# Patient Record
Sex: FEMALE | Employment: FULL TIME | URBAN - METROPOLITAN AREA
[De-identification: names, ages, dates, MRNs, and addresses within clinical notes are randomized per-mention and may not be internally consistent; named-entity substitution may affect disease eponyms.]

---

## 2019-12-04 ENCOUNTER — OFFICE VISIT (OUTPATIENT)
Dept: FAMILY MEDICINE | Facility: CLINIC | Age: 19
End: 2019-12-04

## 2019-12-04 VITALS
RESPIRATION RATE: 18 BRPM | SYSTOLIC BLOOD PRESSURE: 129 MMHG | BODY MASS INDEX: 21.9 KG/M2 | WEIGHT: 116 LBS | DIASTOLIC BLOOD PRESSURE: 88 MMHG | HEIGHT: 61 IN | TEMPERATURE: 98.4 F | OXYGEN SATURATION: 99 % | HEART RATE: 69 BPM

## 2019-12-04 DIAGNOSIS — M54.2 NECK PAIN: Primary | ICD-10-CM

## 2019-12-04 DIAGNOSIS — N91.2 AMENORRHEA: ICD-10-CM

## 2019-12-04 LAB
HCG SERPL QL: NEGATIVE
HCG UR QL: NEGATIVE

## 2019-12-04 PROCEDURE — 84703 CHORIONIC GONADOTROPIN ASSAY: CPT | Performed by: PHYSICIAN ASSISTANT

## 2019-12-04 PROCEDURE — 81025 URINE PREGNANCY TEST: CPT | Performed by: PHYSICIAN ASSISTANT

## 2019-12-04 PROCEDURE — 84702 CHORIONIC GONADOTROPIN TEST: CPT | Performed by: PHYSICIAN ASSISTANT

## 2019-12-04 PROCEDURE — 36415 COLL VENOUS BLD VENIPUNCTURE: CPT | Performed by: PHYSICIAN ASSISTANT

## 2019-12-04 PROCEDURE — 99207 ZZC NO CHARGE LOS: CPT | Performed by: PHYSICIAN ASSISTANT

## 2019-12-04 SDOH — HEALTH STABILITY: MENTAL HEALTH: HOW OFTEN DO YOU HAVE A DRINK CONTAINING ALCOHOL?: NEVER

## 2019-12-04 ASSESSMENT — MIFFLIN-ST. JEOR: SCORE: 1229.58

## 2019-12-04 ASSESSMENT — PAIN SCALES - GENERAL: PAINLEVEL: MODERATE PAIN (4)
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
## 2019-12-05 ENCOUNTER — ANCILLARY PROCEDURE (OUTPATIENT)
Dept: GENERAL RADIOLOGY | Facility: CLINIC | Age: 19
End: 2019-12-05
Attending: PHYSICIAN ASSISTANT
Payer: COMMERCIAL

## 2019-12-05 ENCOUNTER — OFFICE VISIT (OUTPATIENT)
Dept: FAMILY MEDICINE | Facility: CLINIC | Age: 19
End: 2019-12-05

## 2019-12-05 VITALS
DIASTOLIC BLOOD PRESSURE: 78 MMHG | WEIGHT: 117 LBS | HEIGHT: 61 IN | HEART RATE: 83 BPM | TEMPERATURE: 98.5 F | SYSTOLIC BLOOD PRESSURE: 124 MMHG | BODY MASS INDEX: 22.09 KG/M2 | RESPIRATION RATE: 16 BRPM | OXYGEN SATURATION: 100 %
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
## 2019-12-05 DIAGNOSIS — M54.6 ACUTE MIDLINE THORACIC BACK PAIN: ICD-10-CM

## 2019-12-05 DIAGNOSIS — M54.2 NECK PAIN: ICD-10-CM
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
## 2019-12-05 LAB — B-HCG SERPL-ACNC: <1 IU/L (ref 0–5)

## 2019-12-05 PROCEDURE — 99203 OFFICE O/P NEW LOW 30 MIN: CPT | Performed by: PHYSICIAN ASSISTANT

## 2019-12-05 PROCEDURE — 72072 X-RAY EXAM THORAC SPINE 3VWS: CPT | Mod: FY

## 2019-12-05 PROCEDURE — 72040 X-RAY EXAM NECK SPINE 2-3 VW: CPT | Mod: FY

## 2019-12-05 RX ORDER — CYCLOBENZAPRINE HCL 10 MG
10 TABLET ORAL 3 TIMES DAILY PRN
Qty: 90 TABLET | Refills: 0 | Status: ON HOLD | OUTPATIENT
Start: 2019-12-05 | End: 2022-05-17

## 2019-12-05 RX ORDER — IBUPROFEN 600 MG/1
600 TABLET, FILM COATED ORAL EVERY 6 HOURS PRN
Qty: 60 TABLET | Refills: 0 | Status: ON HOLD | OUTPATIENT
Start: 2019-12-05 | End: 2022-05-17

## 2019-12-05 ASSESSMENT — MIFFLIN-ST. JEOR: SCORE: 1234.12

## 2019-12-05 ASSESSMENT — PAIN SCALES - GENERAL: PAINLEVEL: MODERATE PAIN (5)

## 2019-12-05 NOTE — PATIENT INSTRUCTIONS
Wait to work out until pain free  Take ibuprofen 600 mg four times a day with food as needed for pain  Take flexeril 10 mg three times a day as needed for pain.  Return urgently if any change in symptoms like increasing pain, numbness, weakness or other change in symptoms   Follow up with us if headache not improving over the next week
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
## 2020-03-11 ENCOUNTER — HEALTH MAINTENANCE LETTER (OUTPATIENT)
Age: 20
End: 2020-03-11

## 2021-01-04 ENCOUNTER — HEALTH MAINTENANCE LETTER (OUTPATIENT)
Age: 21
End: 2021-01-04

## 2021-04-25 ENCOUNTER — HEALTH MAINTENANCE LETTER (OUTPATIENT)
Age: 21
End: 2021-04-25

## 2021-10-10 ENCOUNTER — HEALTH MAINTENANCE LETTER (OUTPATIENT)
Age: 21
End: 2021-10-10

## 2021-11-10 LAB
ABO (EXTERNAL): NORMAL
HEPATITIS B SURFACE ANTIGEN (EXTERNAL): NONREACTIVE
RH (EXTERNAL): POSITIVE
RUBELLA ANTIBODY IGG (EXTERNAL): NORMAL
TREPONEMA PALLIDUM ANTIBODY (EXTERNAL): NONREACTIVE

## 2022-04-27 ENCOUNTER — TRANSFERRED RECORDS (OUTPATIENT)
Dept: HEALTH INFORMATION MANAGEMENT | Facility: CLINIC | Age: 22
End: 2022-04-27
Payer: COMMERCIAL

## 2022-04-27 LAB — GROUP B STREPTOCOCCUS (EXTERNAL): NEGATIVE

## 2022-05-17 ENCOUNTER — HOSPITAL ENCOUNTER (INPATIENT)
Facility: CLINIC | Age: 22
LOS: 2 days | Discharge: HOME | End: 2022-05-19
Attending: OBSTETRICS & GYNECOLOGY | Admitting: OBSTETRICS & GYNECOLOGY
Payer: COMMERCIAL

## 2022-05-17 PROBLEM — Z36.89 ENCOUNTER FOR TRIAGE IN PREGNANT PATIENT: Status: ACTIVE | Noted: 2022-05-17

## 2022-05-17 LAB
ABO/RH(D): NORMAL
ANTIBODY SCREEN: NEGATIVE
HOLD SPECIMEN: NORMAL
HOLD SPECIMEN: NORMAL
RUPTURE OF FETAL MEMBRANES BY ROM PLUS: POSITIVE
SARS-COV-2 RNA RESP QL NAA+PROBE: NEGATIVE
SPECIMEN EXPIRATION DATE: NORMAL

## 2022-05-17 PROCEDURE — 59025 FETAL NON-STRESS TEST: CPT

## 2022-05-17 PROCEDURE — 84112 EVAL AMNIOTIC FLUID PROTEIN: CPT | Performed by: OBSTETRICS & GYNECOLOGY

## 2022-05-17 PROCEDURE — C9803 HOPD COVID-19 SPEC COLLECT: HCPCS

## 2022-05-17 PROCEDURE — 86780 TREPONEMA PALLIDUM: CPT | Performed by: OBSTETRICS & GYNECOLOGY

## 2022-05-17 PROCEDURE — 250N000009 HC RX 250: Performed by: OBSTETRICS & GYNECOLOGY

## 2022-05-17 PROCEDURE — 120N000001 HC R&B MED SURG/OB

## 2022-05-17 PROCEDURE — 258N000003 HC RX IP 258 OP 636: Performed by: OBSTETRICS & GYNECOLOGY

## 2022-05-17 PROCEDURE — 86901 BLOOD TYPING SEROLOGIC RH(D): CPT | Performed by: OBSTETRICS & GYNECOLOGY

## 2022-05-17 PROCEDURE — 36415 COLL VENOUS BLD VENIPUNCTURE: CPT | Performed by: OBSTETRICS & GYNECOLOGY

## 2022-05-17 PROCEDURE — U0005 INFEC AGEN DETEC AMPLI PROBE: HCPCS | Performed by: OBSTETRICS & GYNECOLOGY

## 2022-05-17 PROCEDURE — G0463 HOSPITAL OUTPT CLINIC VISIT: HCPCS | Mod: 25

## 2022-05-17 RX ORDER — PROCHLORPERAZINE MALEATE 5 MG
10 TABLET ORAL EVERY 6 HOURS PRN
Status: DISCONTINUED | OUTPATIENT
Start: 2022-05-17 | End: 2022-05-17 | Stop reason: HOSPADM

## 2022-05-17 RX ORDER — MISOPROSTOL 200 UG/1
400 TABLET ORAL
Status: DISCONTINUED | OUTPATIENT
Start: 2022-05-17 | End: 2022-05-18 | Stop reason: HOSPADM

## 2022-05-17 RX ORDER — OXYTOCIN/0.9 % SODIUM CHLORIDE 30/500 ML
340 PLASTIC BAG, INJECTION (ML) INTRAVENOUS CONTINUOUS PRN
Status: DISCONTINUED | OUTPATIENT
Start: 2022-05-17 | End: 2022-05-18 | Stop reason: HOSPADM

## 2022-05-17 RX ORDER — OXYTOCIN 10 [USP'U]/ML
10 INJECTION, SOLUTION INTRAMUSCULAR; INTRAVENOUS
Status: DISCONTINUED | OUTPATIENT
Start: 2022-05-17 | End: 2022-05-18 | Stop reason: HOSPADM

## 2022-05-17 RX ORDER — SODIUM CHLORIDE, SODIUM LACTATE, POTASSIUM CHLORIDE, CALCIUM CHLORIDE 600; 310; 30; 20 MG/100ML; MG/100ML; MG/100ML; MG/100ML
INJECTION, SOLUTION INTRAVENOUS CONTINUOUS PRN
Status: DISCONTINUED | OUTPATIENT
Start: 2022-05-17 | End: 2022-05-18 | Stop reason: HOSPADM

## 2022-05-17 RX ORDER — PROCHLORPERAZINE MALEATE 5 MG
10 TABLET ORAL EVERY 6 HOURS PRN
Status: DISCONTINUED | OUTPATIENT
Start: 2022-05-17 | End: 2022-05-18 | Stop reason: HOSPADM

## 2022-05-17 RX ORDER — LIDOCAINE 40 MG/G
CREAM TOPICAL
Status: DISCONTINUED | OUTPATIENT
Start: 2022-05-17 | End: 2022-05-18 | Stop reason: HOSPADM

## 2022-05-17 RX ORDER — METOCLOPRAMIDE 10 MG/1
10 TABLET ORAL EVERY 6 HOURS PRN
Status: DISCONTINUED | OUTPATIENT
Start: 2022-05-17 | End: 2022-05-18 | Stop reason: HOSPADM

## 2022-05-17 RX ORDER — CARBOPROST TROMETHAMINE 250 UG/ML
250 INJECTION, SOLUTION INTRAMUSCULAR
Status: DISCONTINUED | OUTPATIENT
Start: 2022-05-17 | End: 2022-05-18 | Stop reason: HOSPADM

## 2022-05-17 RX ORDER — ONDANSETRON 2 MG/ML
4 INJECTION INTRAMUSCULAR; INTRAVENOUS EVERY 6 HOURS PRN
Status: DISCONTINUED | OUTPATIENT
Start: 2022-05-17 | End: 2022-05-17 | Stop reason: HOSPADM

## 2022-05-17 RX ORDER — OXYTOCIN/0.9 % SODIUM CHLORIDE 30/500 ML
1-24 PLASTIC BAG, INJECTION (ML) INTRAVENOUS CONTINUOUS
Status: DISCONTINUED | OUTPATIENT
Start: 2022-05-17 | End: 2022-05-18 | Stop reason: HOSPADM

## 2022-05-17 RX ORDER — ACETAMINOPHEN 325 MG/1
650 TABLET ORAL EVERY 4 HOURS PRN
Status: DISCONTINUED | OUTPATIENT
Start: 2022-05-17 | End: 2022-05-18 | Stop reason: HOSPADM

## 2022-05-17 RX ORDER — FENTANYL CITRATE 50 UG/ML
50 INJECTION, SOLUTION INTRAMUSCULAR; INTRAVENOUS EVERY 30 MIN PRN
Status: DISCONTINUED | OUTPATIENT
Start: 2022-05-17 | End: 2022-05-18 | Stop reason: HOSPADM

## 2022-05-17 RX ORDER — METOCLOPRAMIDE 10 MG/1
10 TABLET ORAL EVERY 6 HOURS PRN
Status: DISCONTINUED | OUTPATIENT
Start: 2022-05-17 | End: 2022-05-17 | Stop reason: HOSPADM

## 2022-05-17 RX ORDER — ONDANSETRON 2 MG/ML
4 INJECTION INTRAMUSCULAR; INTRAVENOUS EVERY 6 HOURS PRN
Status: DISCONTINUED | OUTPATIENT
Start: 2022-05-17 | End: 2022-05-18 | Stop reason: HOSPADM

## 2022-05-17 RX ORDER — NALOXONE HYDROCHLORIDE 0.4 MG/ML
0.4 INJECTION, SOLUTION INTRAMUSCULAR; INTRAVENOUS; SUBCUTANEOUS
Status: DISCONTINUED | OUTPATIENT
Start: 2022-05-17 | End: 2022-05-18 | Stop reason: HOSPADM

## 2022-05-17 RX ORDER — TRANEXAMIC ACID 10 MG/ML
1 INJECTION, SOLUTION INTRAVENOUS EVERY 30 MIN PRN
Status: DISCONTINUED | OUTPATIENT
Start: 2022-05-17 | End: 2022-05-18 | Stop reason: HOSPADM

## 2022-05-17 RX ORDER — MISOPROSTOL 200 UG/1
800 TABLET ORAL
Status: DISCONTINUED | OUTPATIENT
Start: 2022-05-17 | End: 2022-05-18 | Stop reason: HOSPADM

## 2022-05-17 RX ORDER — OXYTOCIN 10 [USP'U]/ML
10 INJECTION, SOLUTION INTRAMUSCULAR; INTRAVENOUS
Status: DISCONTINUED | OUTPATIENT
Start: 2022-05-17 | End: 2022-05-19

## 2022-05-17 RX ORDER — IBUPROFEN 400 MG/1
800 TABLET, FILM COATED ORAL
Status: DISCONTINUED | OUTPATIENT
Start: 2022-05-17 | End: 2022-05-18

## 2022-05-17 RX ORDER — OXYTOCIN/0.9 % SODIUM CHLORIDE 30/500 ML
100-340 PLASTIC BAG, INJECTION (ML) INTRAVENOUS CONTINUOUS PRN
Status: DISCONTINUED | OUTPATIENT
Start: 2022-05-17 | End: 2022-05-19

## 2022-05-17 RX ORDER — NALOXONE HYDROCHLORIDE 0.4 MG/ML
0.2 INJECTION, SOLUTION INTRAMUSCULAR; INTRAVENOUS; SUBCUTANEOUS
Status: DISCONTINUED | OUTPATIENT
Start: 2022-05-17 | End: 2022-05-18 | Stop reason: HOSPADM

## 2022-05-17 RX ORDER — METHYLERGONOVINE MALEATE 0.2 MG/ML
200 INJECTION INTRAVENOUS
Status: DISCONTINUED | OUTPATIENT
Start: 2022-05-17 | End: 2022-05-18 | Stop reason: HOSPADM

## 2022-05-17 RX ORDER — CITRIC ACID/SODIUM CITRATE 334-500MG
30 SOLUTION, ORAL ORAL
Status: DISCONTINUED | OUTPATIENT
Start: 2022-05-17 | End: 2022-05-18 | Stop reason: HOSPADM

## 2022-05-17 RX ORDER — METOCLOPRAMIDE HYDROCHLORIDE 5 MG/ML
10 INJECTION INTRAMUSCULAR; INTRAVENOUS EVERY 6 HOURS PRN
Status: DISCONTINUED | OUTPATIENT
Start: 2022-05-17 | End: 2022-05-18 | Stop reason: HOSPADM

## 2022-05-17 RX ORDER — PROCHLORPERAZINE 25 MG
25 SUPPOSITORY, RECTAL RECTAL EVERY 12 HOURS PRN
Status: DISCONTINUED | OUTPATIENT
Start: 2022-05-17 | End: 2022-05-17 | Stop reason: HOSPADM

## 2022-05-17 RX ORDER — SODIUM CHLORIDE, SODIUM LACTATE, POTASSIUM CHLORIDE, CALCIUM CHLORIDE 600; 310; 30; 20 MG/100ML; MG/100ML; MG/100ML; MG/100ML
INJECTION, SOLUTION INTRAVENOUS CONTINUOUS
Status: DISCONTINUED | OUTPATIENT
Start: 2022-05-17 | End: 2022-05-18 | Stop reason: HOSPADM

## 2022-05-17 RX ORDER — KETOROLAC TROMETHAMINE 30 MG/ML
30 INJECTION, SOLUTION INTRAMUSCULAR; INTRAVENOUS
Status: DISCONTINUED | OUTPATIENT
Start: 2022-05-17 | End: 2022-05-19

## 2022-05-17 RX ORDER — PROCHLORPERAZINE 25 MG
25 SUPPOSITORY, RECTAL RECTAL EVERY 12 HOURS PRN
Status: DISCONTINUED | OUTPATIENT
Start: 2022-05-17 | End: 2022-05-18 | Stop reason: HOSPADM

## 2022-05-17 RX ORDER — LEVOTHYROXINE SODIUM 25 UG/1
25 TABLET ORAL DAILY
COMMUNITY
Start: 2022-04-10

## 2022-05-17 RX ORDER — ONDANSETRON 4 MG/1
4 TABLET, ORALLY DISINTEGRATING ORAL EVERY 6 HOURS PRN
Status: DISCONTINUED | OUTPATIENT
Start: 2022-05-17 | End: 2022-05-18 | Stop reason: HOSPADM

## 2022-05-17 RX ORDER — ONDANSETRON 4 MG/1
4 TABLET, ORALLY DISINTEGRATING ORAL EVERY 6 HOURS PRN
Status: DISCONTINUED | OUTPATIENT
Start: 2022-05-17 | End: 2022-05-17 | Stop reason: HOSPADM

## 2022-05-17 RX ORDER — METOCLOPRAMIDE HYDROCHLORIDE 5 MG/ML
10 INJECTION INTRAMUSCULAR; INTRAVENOUS EVERY 6 HOURS PRN
Status: DISCONTINUED | OUTPATIENT
Start: 2022-05-17 | End: 2022-05-17 | Stop reason: HOSPADM

## 2022-05-17 RX ADMIN — Medication 2 MILLI-UNITS/MIN: at 22:36

## 2022-05-17 RX ADMIN — SODIUM CHLORIDE, POTASSIUM CHLORIDE, SODIUM LACTATE AND CALCIUM CHLORIDE: 600; 310; 30; 20 INJECTION, SOLUTION INTRAVENOUS at 22:35

## 2022-05-17 RX ADMIN — SODIUM CHLORIDE, POTASSIUM CHLORIDE, SODIUM LACTATE AND CALCIUM CHLORIDE 1000 ML: 600; 310; 30; 20 INJECTION, SOLUTION INTRAVENOUS at 23:42

## 2022-05-18 ENCOUNTER — ANESTHESIA (OUTPATIENT)
Dept: OBGYN | Facility: CLINIC | Age: 22
End: 2022-05-18
Payer: COMMERCIAL

## 2022-05-18 ENCOUNTER — ANESTHESIA EVENT (OUTPATIENT)
Dept: OBGYN | Facility: CLINIC | Age: 22
End: 2022-05-18
Payer: COMMERCIAL

## 2022-05-18 LAB — T PALLIDUM AB SER QL: NONREACTIVE

## 2022-05-18 PROCEDURE — 258N000003 HC RX IP 258 OP 636: Performed by: OBSTETRICS & GYNECOLOGY

## 2022-05-18 PROCEDURE — 3E0R3BZ INTRODUCTION OF ANESTHETIC AGENT INTO SPINAL CANAL, PERCUTANEOUS APPROACH: ICD-10-PCS | Performed by: ANESTHESIOLOGY

## 2022-05-18 PROCEDURE — 250N000011 HC RX IP 250 OP 636: Performed by: ANESTHESIOLOGY

## 2022-05-18 PROCEDURE — 0KQM0ZZ REPAIR PERINEUM MUSCLE, OPEN APPROACH: ICD-10-PCS | Performed by: OBSTETRICS & GYNECOLOGY

## 2022-05-18 PROCEDURE — 00HU33Z INSERTION OF INFUSION DEVICE INTO SPINAL CANAL, PERCUTANEOUS APPROACH: ICD-10-PCS | Performed by: ANESTHESIOLOGY

## 2022-05-18 PROCEDURE — 250N000013 HC RX MED GY IP 250 OP 250 PS 637: Performed by: OBSTETRICS & GYNECOLOGY

## 2022-05-18 PROCEDURE — 120N000012 HC R&B POSTPARTUM

## 2022-05-18 PROCEDURE — 250N000009 HC RX 250: Performed by: OBSTETRICS & GYNECOLOGY

## 2022-05-18 PROCEDURE — 722N000001 HC LABOR CARE VAGINAL DELIVERY SINGLE

## 2022-05-18 PROCEDURE — 250N000011 HC RX IP 250 OP 636: Performed by: OBSTETRICS & GYNECOLOGY

## 2022-05-18 PROCEDURE — 370N000003 HC ANESTHESIA WARD SERVICE

## 2022-05-18 RX ORDER — ROPIVACAINE HYDROCHLORIDE 2 MG/ML
INJECTION, SOLUTION EPIDURAL; INFILTRATION; PERINEURAL
Status: COMPLETED | OUTPATIENT
Start: 2022-05-18 | End: 2022-05-18

## 2022-05-18 RX ORDER — BISACODYL 10 MG
10 SUPPOSITORY, RECTAL RECTAL DAILY PRN
Status: DISCONTINUED | OUTPATIENT
Start: 2022-05-18 | End: 2022-05-19 | Stop reason: HOSPADM

## 2022-05-18 RX ORDER — NALOXONE HYDROCHLORIDE 0.4 MG/ML
0.4 INJECTION, SOLUTION INTRAMUSCULAR; INTRAVENOUS; SUBCUTANEOUS
Status: DISCONTINUED | OUTPATIENT
Start: 2022-05-18 | End: 2022-05-19 | Stop reason: HOSPADM

## 2022-05-18 RX ORDER — OXYTOCIN/0.9 % SODIUM CHLORIDE 30/500 ML
340 PLASTIC BAG, INJECTION (ML) INTRAVENOUS CONTINUOUS PRN
Status: DISCONTINUED | OUTPATIENT
Start: 2022-05-18 | End: 2022-05-19 | Stop reason: HOSPADM

## 2022-05-18 RX ORDER — METHYLERGONOVINE MALEATE 0.2 MG/ML
200 INJECTION INTRAVENOUS
Status: DISCONTINUED | OUTPATIENT
Start: 2022-05-18 | End: 2022-05-19 | Stop reason: HOSPADM

## 2022-05-18 RX ORDER — EPHEDRINE SULFATE 50 MG/ML
5 INJECTION, SOLUTION INTRAMUSCULAR; INTRAVENOUS; SUBCUTANEOUS
Status: DISCONTINUED | OUTPATIENT
Start: 2022-05-18 | End: 2022-05-18 | Stop reason: HOSPADM

## 2022-05-18 RX ORDER — CARBOPROST TROMETHAMINE 250 UG/ML
250 INJECTION, SOLUTION INTRAMUSCULAR
Status: DISCONTINUED | OUTPATIENT
Start: 2022-05-18 | End: 2022-05-19 | Stop reason: HOSPADM

## 2022-05-18 RX ORDER — MISOPROSTOL 200 UG/1
400 TABLET ORAL
Status: DISCONTINUED | OUTPATIENT
Start: 2022-05-18 | End: 2022-05-19 | Stop reason: HOSPADM

## 2022-05-18 RX ORDER — IBUPROFEN 400 MG/1
800 TABLET, FILM COATED ORAL EVERY 6 HOURS PRN
Status: DISCONTINUED | OUTPATIENT
Start: 2022-05-18 | End: 2022-05-19 | Stop reason: HOSPADM

## 2022-05-18 RX ORDER — OXYTOCIN 10 [USP'U]/ML
10 INJECTION, SOLUTION INTRAMUSCULAR; INTRAVENOUS
Status: DISCONTINUED | OUTPATIENT
Start: 2022-05-18 | End: 2022-05-19 | Stop reason: HOSPADM

## 2022-05-18 RX ORDER — ROPIVACAINE HYDROCHLORIDE 2 MG/ML
10 INJECTION, SOLUTION EPIDURAL; INFILTRATION; PERINEURAL ONCE
Status: DISCONTINUED | OUTPATIENT
Start: 2022-05-18 | End: 2022-05-18 | Stop reason: HOSPADM

## 2022-05-18 RX ORDER — DIPHENHYDRAMINE HYDROCHLORIDE 50 MG/ML
25 INJECTION INTRAMUSCULAR; INTRAVENOUS EVERY 6 HOURS PRN
Status: DISCONTINUED | OUTPATIENT
Start: 2022-05-18 | End: 2022-05-19 | Stop reason: HOSPADM

## 2022-05-18 RX ORDER — MODIFIED LANOLIN
OINTMENT (GRAM) TOPICAL
Status: DISCONTINUED | OUTPATIENT
Start: 2022-05-18 | End: 2022-05-19 | Stop reason: HOSPADM

## 2022-05-18 RX ORDER — ONDANSETRON 2 MG/ML
4 INJECTION INTRAMUSCULAR; INTRAVENOUS EVERY 6 HOURS PRN
Status: DISCONTINUED | OUTPATIENT
Start: 2022-05-18 | End: 2022-05-18 | Stop reason: HOSPADM

## 2022-05-18 RX ORDER — OXYCODONE HYDROCHLORIDE 5 MG/1
5 TABLET ORAL EVERY 4 HOURS PRN
Status: DISCONTINUED | OUTPATIENT
Start: 2022-05-18 | End: 2022-05-19 | Stop reason: HOSPADM

## 2022-05-18 RX ORDER — NALOXONE HYDROCHLORIDE 0.4 MG/ML
0.2 INJECTION, SOLUTION INTRAMUSCULAR; INTRAVENOUS; SUBCUTANEOUS
Status: DISCONTINUED | OUTPATIENT
Start: 2022-05-18 | End: 2022-05-19 | Stop reason: HOSPADM

## 2022-05-18 RX ORDER — MISOPROSTOL 200 UG/1
800 TABLET ORAL
Status: DISCONTINUED | OUTPATIENT
Start: 2022-05-18 | End: 2022-05-19 | Stop reason: HOSPADM

## 2022-05-18 RX ORDER — NALBUPHINE HYDROCHLORIDE 20 MG/ML
2.5-5 INJECTION, SOLUTION INTRAMUSCULAR; INTRAVENOUS; SUBCUTANEOUS EVERY 6 HOURS PRN
Status: DISCONTINUED | OUTPATIENT
Start: 2022-05-18 | End: 2022-05-19 | Stop reason: HOSPADM

## 2022-05-18 RX ORDER — ONDANSETRON 4 MG/1
4 TABLET, ORALLY DISINTEGRATING ORAL EVERY 6 HOURS PRN
Status: DISCONTINUED | OUTPATIENT
Start: 2022-05-18 | End: 2022-05-18 | Stop reason: HOSPADM

## 2022-05-18 RX ORDER — DIPHENHYDRAMINE HCL 25 MG
25 CAPSULE ORAL EVERY 6 HOURS PRN
Status: DISCONTINUED | OUTPATIENT
Start: 2022-05-18 | End: 2022-05-19 | Stop reason: HOSPADM

## 2022-05-18 RX ORDER — TRANEXAMIC ACID 10 MG/ML
1 INJECTION, SOLUTION INTRAVENOUS EVERY 30 MIN PRN
Status: DISCONTINUED | OUTPATIENT
Start: 2022-05-18 | End: 2022-05-19 | Stop reason: HOSPADM

## 2022-05-18 RX ORDER — DOCUSATE SODIUM 100 MG/1
100 CAPSULE, LIQUID FILLED ORAL DAILY
Status: DISCONTINUED | OUTPATIENT
Start: 2022-05-18 | End: 2022-05-19 | Stop reason: HOSPADM

## 2022-05-18 RX ORDER — HYDROCORTISONE 25 MG/G
CREAM TOPICAL 3 TIMES DAILY PRN
Status: DISCONTINUED | OUTPATIENT
Start: 2022-05-18 | End: 2022-05-19 | Stop reason: HOSPADM

## 2022-05-18 RX ORDER — ACETAMINOPHEN 325 MG/1
650 TABLET ORAL EVERY 4 HOURS PRN
Status: DISCONTINUED | OUTPATIENT
Start: 2022-05-18 | End: 2022-05-19 | Stop reason: HOSPADM

## 2022-05-18 RX ADMIN — DIPHENHYDRAMINE HYDROCHLORIDE 25 MG: 50 INJECTION, SOLUTION INTRAMUSCULAR; INTRAVENOUS at 05:37

## 2022-05-18 RX ADMIN — Medication: at 00:26

## 2022-05-18 RX ADMIN — ACETAMINOPHEN 650 MG: 325 TABLET ORAL at 12:16

## 2022-05-18 RX ADMIN — Medication: at 06:53

## 2022-05-18 RX ADMIN — DOCUSATE SODIUM 100 MG: 100 CAPSULE, LIQUID FILLED ORAL at 12:16

## 2022-05-18 RX ADMIN — Medication 340 ML/HR: at 09:51

## 2022-05-18 RX ADMIN — ROPIVACAINE HYDROCHLORIDE 10 ML: 2 INJECTION, SOLUTION EPIDURAL; INFILTRATION at 00:27

## 2022-05-18 RX ADMIN — IBUPROFEN 800 MG: 400 TABLET, FILM COATED ORAL at 18:15

## 2022-05-18 RX ADMIN — SODIUM CHLORIDE, POTASSIUM CHLORIDE, SODIUM LACTATE AND CALCIUM CHLORIDE 500 ML: 600; 310; 30; 20 INJECTION, SOLUTION INTRAVENOUS at 01:44

## 2022-05-18 RX ADMIN — IBUPROFEN 800 MG: 400 TABLET, FILM COATED ORAL at 12:16

## 2022-05-18 RX ADMIN — ACETAMINOPHEN 650 MG: 325 TABLET ORAL at 18:15

## 2022-05-18 RX ADMIN — METHYLERGONOVINE MALEATE 200 MCG: 0.2 INJECTION INTRAVENOUS at 10:40

## 2022-05-18 ASSESSMENT — ACTIVITIES OF DAILY LIVING (ADL)
ADLS_ACUITY_SCORE: 20
ADLS_ACUITY_SCORE: 23
ADLS_ACUITY_SCORE: 23

## 2022-05-18 NOTE — PLAN OF CARE
38+5 pt here with SROM & laboring.   Pt comfortable with epidural this shift.  Delivered viable baby girl at 0951 with 150 mL QBL  FF U/2  Pt continued to have a moderate amount of vaginal bleeding despite IV Oxytocin infusing at 340 mL/hr, therefore pt was given IM Methergine, & then straight cathed for 650 mL urine, which slowed flow down to light amount.  IV Oxytocin was then decreased to 100 mL/hr.

## 2022-05-18 NOTE — PLAN OF CARE
Data: Patient presented to [facility] at 1903.   Reason for maternal/fetal assessment per patient is Rule out rupture of membranes  .  Patient is a . Prenatal record reviewed.      OB History    Para Term  AB Living   1 0 0 0 0 0   SAB IAB Ectopic Multiple Live Births   0 0 0 0 0      # Outcome Date GA Lbr Milind/2nd Weight Sex Delivery Anes PTL Lv   1 Current            . Medical history: No past medical history on file.. Gestational Age 38w4d. VSS. Fetal movement present. Patient c/o leaking of fluid that started around 5 pm, countinues to leak moderate amount of fluid. Mild irregular uterine contractions palpated, pt states the feel like abdominal  tightness. Patient denies backache, pelvic pressure, UTI symptoms, GI problems, bloody show, vaginal bleeding, edema, headache, visual disturbances, epigastric or URQ pain, abdominal pain, rupture of membranes. Support persons Emery present.  Action: Verbal consent for EFM. Triage assessment completed. EFM applied. oxygenation documented (see flow record). ROM+ collected and sent to Lab.  Response: Dr. Muir informed of Positive ROM+. Plan per provider is admit to labor, expectant management, recheck at 2230 and if labor not progressing, start pitocin. Patient verbalized agreement with plan. Patient transferred to room 219 ambulatory, oriented to room and call light. Report given to Sarah Blanchard RN.

## 2022-05-18 NOTE — PLAN OF CARE
Pt initially reported severe jonathon pain. Pain has decreased throughout the day. Pt voiding adequately, using ice packs, tucks, tylenol and ibuprofen. Infant spitty and disinterested in breastfeeding this afternoon. Provided reassurance to Sanna and Emery. Assisted with hand expression; Sanna able to hand express numerous drops of colostrum. Continue to monitor and assist as needed.

## 2022-05-18 NOTE — H&P
[facility] Labor and Delivery History and Physical    Sanna Blount MRN# [id number]   Age: 21 year old YOB: 2000     Date of Admission:  2022    Primary care provider: Barbie Lemus           Chief Complaint:   Sanna Blount is a 21 year old female who is 38w5d pregnant and being admitted for SROM.          Pregnancy history:     OBSTETRIC HISTORY:    OB History    Para Term  AB Living   1 0 0 0 0 0   SAB IAB Ectopic Multiple Live Births   0 0 0 0 0      # Outcome Date GA Lbr Milind/2nd Weight Sex Delivery Anes PTL Lv   1 Current                EDC: Estimated Date of Delivery: May 27, 2022    Prenatal Labs:   Lab Results   Component Value Date    AS Negative 2022       GBS Status:   Lab Results   Component Value Date    GBS Negative 2022       Active Problem List  Patient Active Problem List   Diagnosis     Encounter for triage in pregnant patient     Labor and delivery, indication for care       Medication Prior to Admission  Medications Prior to Admission   Medication Sig Dispense Refill Last Dose     Calcium 200 MG TABS Take 1 tablet by mouth daily        levothyroxine (SYNTHROID/LEVOTHROID) 25 MCG tablet Take 25 mcg by mouth daily        Prenatal Vit-DSS-Fe Cbn-FA (PRENATAL AD PO) Take 1 tablet by mouth daily      .        Maternal Past Medical History:     Past Medical History:   Diagnosis Date     Hypothyroidism      Restrictive food intake disorder                        Family History:   This patient has no significant family history            Social History:     Social History     Tobacco Use     Smoking status: Never Smoker     Smokeless tobacco: Never Used   Substance Use Topics     Alcohol use: Never            Review of Systems:   C: NEGATIVE for fever, chills, change in weight  E/M: NEGATIVE for ear, mouth and throat problems  R: NEGATIVE for significant cough or SOB  CV: NEGATIVE for chest pain, palpitations or peripheral edema           Physical Exam:   Vitals were reviewed    Constitutional: Awake, alert, cooperative, no apparent distress, and appears stated age.  Eyes: Lids and lashes normal, pupils equal, round and reactive to light, extra ocular muscles intact, sclera clear, conjunctiva normal.  ENT: Normocephalic, without obvious abnormality, atramatic, sinuses nontender on palpation, external ears without lesions, oral pharynx with moist mucus membranes, tonsils without erythema or exudates, gums normal and good dentition.  Abdomen: No scars, normal bowel sounds, soft, non-distended, non-tender, no masses palpated, no hepatosplenomegally.  Genitourinary: No urethral discharge, normal external genitalia, no hernia.  Neuropsychiatric: Normal affect, mood, orientation, memory and insight.  Skin: No rashes, erythema, pallor, petechia or purpura.     Cervix:   Membranes: SROM   Dilation: 2   Effacement: 80%   Station:-2    Presentation:Vertex  Fetal Heart Rate Tracing: Tier 1 (normal)  Tocometer: external monitor                       Assessment:   Sanna Blount is a 38w5d pregnant female admitted with SROM.          Plan:   Anticipate     Hank Muir MD

## 2022-05-18 NOTE — PLAN OF CARE
Dr. Muir updated on FHT's with a prolonged deceleration after trying to restart pitocin so pitocin shut off, having mixed early and variable decelerations with every contraction. Have had patient in exaggerated runners position and a corkscrew position. SVE lip/-1, but lip is very edematous and could feel sutures indicating the baby is OP. Received orders to give IV benadryl and will recheck SVE.     0646 Updated MD on SVE unchanged from previous, still having variables with contractions. Will continue to monitor and MD will round on patient in AM.

## 2022-05-18 NOTE — PROVIDER NOTIFICATION
05/18/22 0804   Provider Notification   Provider Name/Title Dr. Muir   Method of Notification Phone   Request Evaluate - Remote   Notification Reason Variability Change;Labor Status;Uterine Activity;Status Update;SVE;Decels  (MD given status update that pt has been anterior lip since 0510 with swelling of lip noted at 0640 per previous RN, intermittent late decels, as well as coupling of contractions. Will plan to recheck SVE at 0845.)

## 2022-05-18 NOTE — PROVIDER NOTIFICATION
05/18/22 0854   Provider Notification   Provider Name/Title Dr. Muir   Method of Notification Electronic Page   Request Evaluate - Remote   Notification Reason SVE;Status Update  (MD updated on SVE & that pt is feeling perineal pressure with contractions. Plan to being pushing.)

## 2022-05-18 NOTE — L&D DELIVERY NOTE
Vaginal Delivery Note:    PREOPERATIVE DIAGNOSIS: Single intrauterine pregnancy at 38w5d   POSTOPERATIVE DIAGNOSIS: Same, delivered  OPERATION PERFORMED:  Normal spontaneous vaginal delivery  ANESTHESIA: Epidural  EBL: 175 mL      SPECIMENS: Cord blood   COMPLICATIONS: None  FINDINGS: Viable female infant @ 0951 grams with APGARs of 8 and 9 at 1 and 5 minutes, respectively.  CONDITION: Both mother and infant stable in the immediate postpartum period. The patient remained in labor and delivery for recovery and the infant was kept in the room with her.  INDICATIONS: The patient is a 21 year old  who presented at 38w5d secondary to SROM. She was confirmed rupture and was in spontaneous labor.    OPERATION: The patient progressed to complete/complete/+ 2 station and pushed for 50 minutes to deliver a viable female @ 0951 via  over an intact perineum under epidural anesthesia. The baby delivered in  occiput anterior position. There was a loose nuchal cord. The remainder of the body delivered without incident. Nose and mouth were bulb suctioned and the cord was clamped times two and cut. The baby was placed on the maternal abdomen. Placenta, membranes, and a three vessel cord delivered spontaneously and intact. Inspection of the perineum revealed a 2nd degree perineal laceration that was repaired with 3-0 Vicryl in routine fashion with excellent hemostasis. The rectum, sulci, and cervix were inspected and noted to be intact. The fundus was firm with IV oxytocin and fundal massage. Sponge and instrument counts were correct x2 at the end of the procedure.     Hank Muir MD on 2022

## 2022-05-18 NOTE — PROVIDER NOTIFICATION
05/18/22 1038   Provider Notification   Provider Name/Title Dr. Muir   Method of Notification Phone   Request Evaluate - Remote   Notification Reason Status Update;Other (Comment)  (MD notified that pt continues to have moderate amount of vaginal bleeding. IV Oxytocin infusing at 340 mL/hr. T.O.R.B. received for IM Methergine.)

## 2022-05-18 NOTE — PLAN OF CARE
Dr. Muir called for update- received orders to start pitocin at 2200. Updated on patient having mild contractions and FHT's 140s moderate variability with accelerations. Will continue to monitor and update MD as needed.

## 2022-05-18 NOTE — ANESTHESIA PREPROCEDURE EVALUATION
Anesthesia Pre-Procedure Evaluation    Patient: Sanna Blount   MRN: [id number] : 2000        Procedure : * No procedures listed *          Past Medical History:   Diagnosis Date     Hypothyroidism       Past Surgical History:   Procedure Laterality Date     wisdom teeth        No Known Allergies   Social History     Tobacco Use     Smoking status: Never Smoker     Smokeless tobacco: Never Used   Substance Use Topics     Alcohol use: Never      Wt Readings from Last 1 Encounters:   22 68.9 kg (152 lb)        Anesthesia Evaluation            ROS/MED HX  ENT/Pulmonary:       Neurologic:       Cardiovascular:       METS/Exercise Tolerance:     Hematologic:       Musculoskeletal:       GI/Hepatic:       Renal/Genitourinary:       Endo:     (+) thyroid problem, hypothyroidism,     Psychiatric/Substance Use:       Infectious Disease:       Malignancy:       Other:            Physical Exam    Airway        Mallampati: II       Respiratory Devices and Support         Dental           Cardiovascular   cardiovascular exam normal          Pulmonary   pulmonary exam normal                OUTSIDE LABS:  CBC: No results found for: WBC, HGB, HCT, PLT  BMP: No results found for: NA, POTASSIUM, CHLORIDE, CO2, BUN, CR, GLC  COAGS: No results found for: PTT, INR, FIBR  POC:   Lab Results   Component Value Date    HCG Negative 2019    HCGS Negative 2019     HEPATIC: No results found for: ALBUMIN, PROTTOTAL, ALT, AST, GGT, ALKPHOS, BILITOTAL, BILIDIRECT, YASMEEN  OTHER: No results found for: PH, LACT, A1C, AUTUMN, PHOS, MAG, LIPASE, AMYLASE, TSH, T4, T3, CRP, SED    Anesthesia Plan    ASA Status:  2      Anesthesia Type: Epidural.              Consents    Anesthesia Plan(s) and associated risks, benefits, and realistic alternatives discussed. Questions answered and patient/representative(s) expressed understanding.    - Discussed:     - Discussed with:  Patient         Postoperative Care            Comments:                 BRIANNA JUSTIN MD

## 2022-05-18 NOTE — ANESTHESIA PROCEDURE NOTES
Epidural catheter Procedure Note    Pre-Procedure   Staff -        Anesthesiologist:  Bg Weeks MD       Performed By: anesthesiologist       Location: OB       Pre-Anesthestic Checklist: patient identified, IV checked, risks and benefits discussed, informed consent, monitors and equipment checked, pre-op evaluation and at physician/surgeon's request  Timeout:       Correct Patient: Yes        Correct Procedure: Yes        Correct Site: Yes        Correct Position: Yes   Procedure Documentation  Procedure: epidural catheter       Patient Position: sitting       Patient Prep/Sterile Barriers: sterile gloves, mask, patient draped       Skin prep: Betadine       Local skin infiltrated with mL of 1% lidocaine.        Insertion Site: L4-5. (midline approach).       Technique: LORT saline        LUKE at 4 cm.       Needle Type: Clixtry needle       Needle Gauge: 17.        Needle Length (Inches): 3.5           Catheter threaded easily.           Threaded 9 cm at skin.         # of attempts: 1 and  # of redirects:     Assessment/Narrative         Paresthesias: No.       Test dose of 3 mL lidocaine 1.5% w/ 1:200,000 epinephrine at.         Test dose negative, 3 minutes after injection, for signs of intravascular, subdural, or intrathecal injection.       Insertion/Infusion Method: LORT saline       Aspiration negative for Heme or CSF via Epidural Catheter.    Medication(s) Administered   0.2% Ropivacaine (Epidural) - EPIDURAL   10 mL - 5/18/2022 12:27:00 AM   Comments:  No complications.  Catheter secured with sterile dressing and tape.  Questions answered.    Orders to manage the epidural infusion have been entered and, through coordination with the nurse, we will continue to manage and monitor the patient's labor epidural.  We will continuously be available to adjust as needed throughout the entire labor and delivery process.

## 2022-05-18 NOTE — PLAN OF CARE
Deceleration noted in FHT's- RN to bedside, patient turned laterally multiple times, pitocin shut off, fluid bolus given. Dr. Muir paged and updated on prolonged deceleration, FHTs currently 135 with moderate variability.

## 2022-05-18 NOTE — PLAN OF CARE
05/18/22 0012   Provider Notification   Provider Name/Title dr. rubi   Method of Notification At Bedside   Request Evaluate in Person   Notification Reason Pain   Dr. Rubi at bedside for epidural placement. Patient tolerated procedure well and placed in left tilt position.

## 2022-05-18 NOTE — PLAN OF CARE
Data: Sanna Blount transferred to [facility] via wheelchair at 1240. Baby transferred via parent's arms.  Action: Receiving unit notified of transfer: Yes. Patient and family notified of room change. Accompanied by Registered Nurse. Oriented patient to surroundings. Call light within reach. ID bands double-checked with receiving RN.  Response: Patient tolerated transfer and is stable.

## 2022-05-18 NOTE — PROVIDER NOTIFICATION
05/18/22 0921   Provider Notification   Provider Name/Title Dr. Muir   Method of Notification Phone   Request Attend Delivery

## 2022-05-18 NOTE — LACTATION NOTE
"This note was copied from a baby's chart.  Initial visit with Mother and Father and baby girl.  Mother states she fed really well after delivery and now is really sleepy.  LC reassured Mother that this is a normal sleeping pattern for  babies and that she fed well after delivery is a really good sign that breast feeding will go well.  Mother and Father educated on normal  behavior, focusing on normal feeding patterns from birth to day 3 of life.      Discussed  breastfeeding basics: 1) watch for early feeding cues (licking lips, stirring or rooting, sucking movement with mouth, hands to mouth), 2) feed infant on demand, a minimum of 8 times in 24 hours, and 3) techniques to waking a sleepy baby to nurse (undress infant, change diaper if necessary, gently stroking bottom of feet and back, snuggling infant skin to skin, expressing colostrum).     Breastfeeding general information reviewed. Reviewed with Mother and Father the breastfeeding section in admission booklet \"Guide to Postpartum and [place] Care\"  and encouraged to record infant feedings, voids, and stools in the feeding log.    Encouraged rooming in, skin to skin, feeding on demand 8-12x/day or sooner if baby cues.    Encouraged Mother to call for assistance with latch or positioning if needed.  Appreciative of visit.  No further questions at this time. Will follow as needed.     Estella Pimentel RN, IBCLC     "

## 2022-05-19 VITALS
DIASTOLIC BLOOD PRESSURE: 65 MMHG | HEART RATE: 66 BPM | SYSTOLIC BLOOD PRESSURE: 101 MMHG | TEMPERATURE: 98 F | WEIGHT: 152 LBS | HEIGHT: 60 IN | RESPIRATION RATE: 16 BRPM | OXYGEN SATURATION: 98 % | BODY MASS INDEX: 29.84 KG/M2

## 2022-05-19 PROCEDURE — 250N000013 HC RX MED GY IP 250 OP 250 PS 637: Performed by: OBSTETRICS & GYNECOLOGY

## 2022-05-19 RX ORDER — IBUPROFEN 800 MG/1
800 TABLET, FILM COATED ORAL EVERY 6 HOURS PRN
COMMUNITY
Start: 2022-05-19

## 2022-05-19 RX ORDER — ACETAMINOPHEN 325 MG/1
650 TABLET ORAL EVERY 4 HOURS PRN
COMMUNITY
Start: 2022-05-19

## 2022-05-19 RX ADMIN — DOCUSATE SODIUM 100 MG: 100 CAPSULE, LIQUID FILLED ORAL at 08:06

## 2022-05-19 RX ADMIN — IBUPROFEN 800 MG: 400 TABLET, FILM COATED ORAL at 13:46

## 2022-05-19 RX ADMIN — ACETAMINOPHEN 650 MG: 325 TABLET ORAL at 08:06

## 2022-05-19 RX ADMIN — IBUPROFEN 800 MG: 400 TABLET, FILM COATED ORAL at 01:46

## 2022-05-19 RX ADMIN — IBUPROFEN 800 MG: 400 TABLET, FILM COATED ORAL at 08:06

## 2022-05-19 RX ADMIN — ACETAMINOPHEN 650 MG: 325 TABLET ORAL at 01:46

## 2022-05-19 RX ADMIN — ACETAMINOPHEN 650 MG: 325 TABLET ORAL at 13:46

## 2022-05-19 ASSESSMENT — ACTIVITIES OF DAILY LIVING (ADL)
ADLS_ACUITY_SCORE: 20

## 2022-05-19 NOTE — PROGRESS NOTES
S: Patient doing well with no overnight issues and no current complaints.  Pain is well controlled.  Tolerating PO intake.  Breast feeding without issues.  Ambulating without difficulty.  Voiding and passing flatus.  Lochia is less than normal menses and decreasing.  Denies fevers, headaches, changes in vision, chest pain, SOB, nausea, vomiting, diarrhea, constipation, RUQ tenderness, dysuria, or LE pain.    O:   Vitals:    22 1200 22 1215 22 1652 22 0144   BP: 128/78 129/66 116/77 97/58   Pulse:   66    Resp:    Temp:   97.6  F (36.4  C) 97.6  F (36.4  C)   TempSrc:   Oral Oral   SpO2: 100% 98%     Weight:       Height:         NAD, AAOx3, RRR, CTAB, ABd soft NTND, Firm fundus 1cm below the umbilicus, LE NT no edema    A: 22yo  s/PPD#1  who is recovering well.    P: Routine post partum care.  Plan for discharge PPD#2.

## 2022-05-19 NOTE — PLAN OF CARE
D: VSS, assessments WDL. Pt states her mother and sister will be coming to stay with her tonight to help with . Pt excited to go home and have help.   I: Pt. received complete discharge paperwork.  Pt. was given times of last dose for all discharge medications in writing on discharge medication sheets.  Discharge teaching included home medication, pain management, activity restrictions, postpartum cares, and signs and symptoms of infection.    A: Discharge outcomes on care plan met.  Mother states understanding and comfort with self and baby cares.  P: Pt. discharged to home.  Pt. was discharged with baby, and bands were checked at time of discharge.  Pt. was accompanied by , nurse and baby, and left with personal belongings.  Pt. to follow up with OB in 2 and 6 weeks per MD order.  Pt. had no further questions at the time of discharge and no unmet needs were identified.

## 2022-05-19 NOTE — DISCHARGE INSTRUCTIONS

## 2022-05-19 NOTE — PLAN OF CARE
Vital signs stable. Voiding without difficulty. Using tylenol/ibuprofen for pain management. Up ambulating free of dizziness. Working on breastfeeding every 2-3 hours. Infant spitty overnight. Encouraged to call with questions/concerns. Will continue to monitor.

## 2022-05-19 NOTE — LACTATION NOTE
"Lactation visit with Sanna, FOB, and baby girl. Sanna was breastfeeding infant at time of visit. Infant latched with wide mouth and nutritive suckling pattern. Sanna shares she is feeling pretty confident with how breastfeeding is going.        Highlighted  breastfeeding basics:   1) Watch for early feeding cues (licking lips, stirring or rooting, sucking movement with mouth, hands to mouth) and always breast feed on DEMAND.  2) Infant should breastfeed a minimum of 8 times in 24 hours. If it has been 3 hours since last breast feeding session, un-swaddle infant and begin skin to skin to entice infant to nurse.  3) Techniques to waking a sleepy baby to nurse: (undress infant, change diaper if necessary, gently stroking bottom of feet and back, snuggling infant skin to skin, expressing colostrum).     Reviewed breast feeding section in our \"Guide to Postpartum and [place] Care.\" Encouraged parents to read about  feeding patterns/behavior: paying special attention to understanding infant's cluster-feeding (when and why's). Educated on nutritive vs non-nutritive suckling patterns. Showed how to record infant feedings along with voids and stools in the provided feeding log.     Reviewed breastfeeding positions and techniques to obtain/maintain deep latch (including nose to nipple alignment and supporting infant's shoulder blades vs head when bringing infant in to latch). Discussed BF should feel like a strong \"tug or pull\" when infant is suckling and if mother experiences a \"pinching or biting\" sensation, how to un-latch infant properly, assess nipple shape and make any necessary adjustments with positioning before re-latching.     Discussed physiology of milk production from colostrum through milk coming in and how the breasts should begin to feel \"heavy or full\" between day 3-5. Answered questions regarding \"how to know when infant is done at the breast\". Educated to infant satiety signs; encouraged " listening for audible swallows along with watching for changes in infant's stool color. Discussed normal infant weight loss and when infant should be back to birth weight. Stressed the importance of continuing to track infant's feeds and void/stools patterns, at least until infant has returned to his birth weight.    Feeding plan recommendations: provide unlimited, on-demand breast feedings: At least 8-12 times/24 hours (reviewed early feeding cues). Encouraged on-going use of a feeding log or brian to record feedings along with void/stool patterns. Avoid pacifiers (until 1 month of age per AAP guidelines) and supplementation with formula unless medically indicated. Follow up with Pediatrician as requested and encouraged lactation follow up. Reviewed [place] outpatient lactation resources. Appreciative of visit.    Supriya Alvarado RN, IBCLC

## 2022-05-22 ENCOUNTER — HEALTH MAINTENANCE LETTER (OUTPATIENT)
Age: 22
End: 2022-05-22

## 2022-09-18 ENCOUNTER — HEALTH MAINTENANCE LETTER (OUTPATIENT)
Age: 22
End: 2022-09-18

## 2023-06-04 ENCOUNTER — HEALTH MAINTENANCE LETTER (OUTPATIENT)
Age: 23
End: 2023-06-04

## 2024-06-03 ENCOUNTER — LAB REQUISITION (OUTPATIENT)
Dept: LAB | Facility: CLINIC | Age: 24
End: 2024-06-03

## 2024-06-03 DIAGNOSIS — Z12.4 ENCOUNTER FOR SCREENING FOR MALIGNANT NEOPLASM OF CERVIX: ICD-10-CM

## 2024-06-03 PROCEDURE — G0145 SCR C/V CYTO,THINLAYER,RESCR: HCPCS | Performed by: OBSTETRICS & GYNECOLOGY

## 2024-06-06 LAB
BKR LAB AP GYN ADEQUACY: NORMAL
BKR LAB AP GYN INTERPRETATION: NORMAL
BKR LAB AP HPV REFLEX: NORMAL
BKR LAB AP LMP: NORMAL
BKR LAB AP PREVIOUS ABNL DX: NORMAL
BKR LAB AP PREVIOUS ABNORMAL: NORMAL
PATH REPORT.COMMENTS IMP SPEC: NORMAL
PATH REPORT.COMMENTS IMP SPEC: NORMAL
PATH REPORT.RELEVANT HX SPEC: NORMAL

## 2024-07-14 ENCOUNTER — HEALTH MAINTENANCE LETTER (OUTPATIENT)
Age: 24
End: 2024-07-14

## 2025-06-25 ENCOUNTER — LAB REQUISITION (OUTPATIENT)
Dept: LAB | Facility: CLINIC | Age: 25
End: 2025-06-25

## 2025-06-25 DIAGNOSIS — Z01.419 ENCOUNTER FOR GYNECOLOGICAL EXAMINATION (GENERAL) (ROUTINE) WITHOUT ABNORMAL FINDINGS: ICD-10-CM

## 2025-06-25 LAB
EST. AVERAGE GLUCOSE BLD GHB EST-MCNC: 108 MG/DL
HBA1C MFR BLD: 5.4 %

## 2025-06-25 PROCEDURE — 84146 ASSAY OF PROLACTIN: CPT | Performed by: OBSTETRICS & GYNECOLOGY

## 2025-06-25 PROCEDURE — 80061 LIPID PANEL: CPT | Performed by: OBSTETRICS & GYNECOLOGY

## 2025-06-25 PROCEDURE — 83036 HEMOGLOBIN GLYCOSYLATED A1C: CPT | Performed by: OBSTETRICS & GYNECOLOGY

## 2025-06-26 LAB
CHOLEST SERPL-MCNC: 228 MG/DL
FASTING STATUS PATIENT QL REPORTED: ABNORMAL
HDLC SERPL-MCNC: 78 MG/DL
LDLC SERPL CALC-MCNC: 136 MG/DL
NONHDLC SERPL-MCNC: 150 MG/DL
PROLACTIN SERPL 3RD IS-MCNC: 12 NG/ML (ref 5–23)
TRIGL SERPL-MCNC: 69 MG/DL